# Patient Record
Sex: FEMALE
[De-identification: names, ages, dates, MRNs, and addresses within clinical notes are randomized per-mention and may not be internally consistent; named-entity substitution may affect disease eponyms.]

---

## 2017-05-04 NOTE — MAMMOGRAPHY REPORT
BONE DEXA:05/04/17 14:15:00



CLINICAL: Postmenopausal.  No comparison.



TECHNIQUE: Two site bone DEXA performed on an Hologic scanner.





FINDINGS:

The average BMD of the lumbar spine L1-L4 is 0.919g/cm squared with a 

T-score of -1.2 and a Z-score of +0.5.



The average BMD of the left hip is 0.877g/cm squared with a T-score of 

-0.5 and a Z-score of +0.6.



IMPRESSION:

1. WHO classification: Osteopenia with increased fracture risk  based 

on lumbar spine measurements.  



2. WHO classification: Normal with average fracture risk  based on left 

hip measurements.  



3. The FRAX 10 year fracture probability for a major osteoporotic 

fracture is 7.3%.



4. The FRAX 10 year fracture probability for hip fracture is 0.4%.



Note: FRAX version 3.01.  Fracture probability calculated for an 

untreated patient.  Fracture probability may be lower if the patient 

has received treatment.



RECOMMENDATION: Clinical correlation and routine screening.





DEFINITIONS:

  BMD     = Bone Mineral Density

  T-score = BMD related to mean peak bone mass of young adult

            (mean expressed in Standard Deviation)

  Z-score = Age matched BMD expressed in SD



World Health Organization (WHO) Diagnostic Criteria

  Normal             T-score > -1 SD

  Osteopenia      T-score between -1 and -2.4 SD

  Osteoporosis    T-score -2.5 SD or below



NOTE:

      BMD is not the only risk factor for fracture; also consider 

factors such as the patient's age, risk of falling, previous 

osteoporotic fracture, family history of osteoporotic fractures, 

current smoker, and low body weight.



All treatment decisions require clinical judgment and consideration of 

individual patient factors, including patient preferences, 

comorbidities, previous drug use and risk factors not captured in the 

FRAX model (e.g. frailty, falls, vitamin D deficiency, increased bone 

turnover, interval significant decline in BMD). 



Fracture probability is calculated for an untreated patient.  Fracture 

probability may be lower if the patient has received treatment.



Z-scores are not calculated if >80 years of age.

## 2018-04-13 ENCOUNTER — HOSPITAL ENCOUNTER (OUTPATIENT)
Dept: HOSPITAL 5 - MAMMO | Age: 63
Discharge: HOME | End: 2018-04-13
Attending: OBSTETRICS & GYNECOLOGY
Payer: COMMERCIAL

## 2018-04-13 DIAGNOSIS — Z12.31: Primary | ICD-10-CM

## 2018-04-13 PROCEDURE — 77067 SCR MAMMO BI INCL CAD: CPT

## 2018-04-13 PROCEDURE — 77063 BREAST TOMOSYNTHESIS BI: CPT

## 2018-04-13 NOTE — MAMMOGRAPHY REPORT
Tomomammogram and 2-D mammogram:



The 2-D mammogram demonstrates a heterogeneously dense fibroglandular 

pattern with no significant findings. The tomomammogram of the right 

breast is also unremarkable. The tomomammogram of the left breast 

demonstrates a 3 mm circumscribed nodule superiorly having no 

suspicious characteristics. No other findings.



CAD used.



Impression:



No significant findings.



Recommendation:



Annual mammogram followup.



BI-RADS CATEGORY:  2 = Benign



ACR BI-RADS MAMMOGRAPHIC CODES:

0 = Needs additional imaging evaluation; 1 = Negative; 2 = Benign; 3 = 

Probably benign; 4 = Suspicious; 5 = Malignant; 6 = Known biopsy-proven 

malignancy



COMMENT:

      1.   Dense breast tissue, i.e., adenosis, fibrocystic 

            changes, etc., may obscure an underlying neoplasm.

      2.   Approximately 10% of cancers are not detected with

            mammography.

      3.   A negative mammography report should not delay biopsy 

            if a clinically suspicious mass is present.

## 2024-03-29 ENCOUNTER — LAB (OUTPATIENT)
Dept: URBAN - METROPOLITAN AREA CLINIC 70 | Facility: CLINIC | Age: 69
End: 2024-03-29

## 2024-03-29 ENCOUNTER — OV NP (OUTPATIENT)
Dept: URBAN - METROPOLITAN AREA CLINIC 70 | Facility: CLINIC | Age: 69
End: 2024-03-29

## 2024-03-29 VITALS
DIASTOLIC BLOOD PRESSURE: 85 MMHG | HEART RATE: 75 BPM | WEIGHT: 176.4 LBS | SYSTOLIC BLOOD PRESSURE: 149 MMHG | BODY MASS INDEX: 31.25 KG/M2 | TEMPERATURE: 97.9 F | HEIGHT: 63 IN

## 2024-03-29 PROBLEM — 312824007: Status: ACTIVE | Noted: 2024-03-29

## 2024-03-29 PROBLEM — 40890009: Status: ACTIVE | Noted: 2024-03-29

## 2024-03-29 PROBLEM — 27885002: Status: ACTIVE | Noted: 2024-03-29

## 2024-03-29 PROBLEM — 162031009: Status: ACTIVE | Noted: 2024-03-29

## 2024-03-29 RX ORDER — AMLODIPINE BESYLATE 2.5 MG/1
TAKE 1 TABLET BY MOUTH DAILY TABLET ORAL
Qty: 90 EACH | Refills: 0 | Status: ACTIVE | COMMUNITY

## 2024-03-29 NOTE — HPI-TODAY'S VISIT:
Recnt hx of heart block  CTA at [facility] showed hiatal hernia  C/o "indigestion" nad badominal fulness C/o epigastric mild soreness Reports dry food like bread gets stuck in throat at times Denies having any nausea, vomiting,  melena, hematochezia, weight loss, lack of appetite Medications and social history reviewed.  Family history reviewed- fakily hx of  colon cancer im Mother nad Fahter

## 2024-04-18 PROBLEM — 166669000: Status: ACTIVE | Noted: 2024-04-18

## 2024-04-18 PROBLEM — 409673008: Status: ACTIVE | Noted: 2024-04-18

## 2024-05-14 ENCOUNTER — OFFICE VISIT (OUTPATIENT)
Dept: URBAN - METROPOLITAN AREA MEDICAL CENTER 42 | Facility: MEDICAL CENTER | Age: 69
End: 2024-05-14
Payer: MEDICARE

## 2024-05-14 DIAGNOSIS — D12.2 ADENOMA OF ASCENDING COLON: ICD-10-CM

## 2024-05-14 DIAGNOSIS — K29.60 OTHER GASTRITIS WITHOUT BLEEDING: ICD-10-CM

## 2024-05-14 DIAGNOSIS — Z12.11 COLON CANCER SCREENING: ICD-10-CM

## 2024-05-14 DIAGNOSIS — K20.80 OTHER ESOPHAGITIS: ICD-10-CM

## 2024-05-14 DIAGNOSIS — Z80.0 BROTHER AT YOUNG AGE FAMILY HISTORY OF COLON CANCER: ICD-10-CM

## 2024-05-14 DIAGNOSIS — K22.2 ACQUIRED ESOPHAGEAL RING: ICD-10-CM

## 2024-05-14 PROCEDURE — 43249 ESOPH EGD DILATION <30 MM: CPT | Performed by: INTERNAL MEDICINE

## 2024-05-14 PROCEDURE — 43239 EGD BIOPSY SINGLE/MULTIPLE: CPT | Performed by: INTERNAL MEDICINE

## 2024-05-14 PROCEDURE — 45385 COLONOSCOPY W/LESION REMOVAL: CPT | Performed by: INTERNAL MEDICINE

## 2024-05-14 PROCEDURE — 45380 COLONOSCOPY AND BIOPSY: CPT | Performed by: INTERNAL MEDICINE

## 2024-06-10 ENCOUNTER — TELEPHONE ENCOUNTER (OUTPATIENT)
Dept: URBAN - METROPOLITAN AREA CLINIC 70 | Facility: CLINIC | Age: 69
End: 2024-06-10

## 2024-06-10 RX ORDER — PANTOPRAZOLE SODIUM 40 MG/1
1 TABLET TABLET, DELAYED RELEASE ORAL ONCE A DAY
Qty: 30 | OUTPATIENT
Start: 2024-06-10

## 2024-06-17 ENCOUNTER — ERX REFILL RESPONSE (OUTPATIENT)
Dept: URBAN - METROPOLITAN AREA CLINIC 70 | Facility: CLINIC | Age: 69
End: 2024-06-17

## 2024-06-17 RX ORDER — PANTOPRAZOLE SODIUM 40 MG/1
1 TABLET TABLET, DELAYED RELEASE ORAL ONCE A DAY
Qty: 30 | Refills: 0 | OUTPATIENT

## 2024-06-17 RX ORDER — PANTOPRAZOLE SODIUM 40 MG/1
1 TABLET TABLET, DELAYED RELEASE ORAL ONCE A DAY
Qty: 30 | OUTPATIENT

## 2024-06-18 ENCOUNTER — OFFICE VISIT (OUTPATIENT)
Dept: URBAN - METROPOLITAN AREA CLINIC 70 | Facility: CLINIC | Age: 69
End: 2024-06-18
Payer: MEDICARE

## 2024-06-18 ENCOUNTER — LAB OUTSIDE AN ENCOUNTER (OUTPATIENT)
Dept: URBAN - METROPOLITAN AREA CLINIC 70 | Facility: CLINIC | Age: 69
End: 2024-06-18

## 2024-06-18 ENCOUNTER — DASHBOARD ENCOUNTERS (OUTPATIENT)
Age: 69
End: 2024-06-18

## 2024-06-18 VITALS
SYSTOLIC BLOOD PRESSURE: 153 MMHG | TEMPERATURE: 97.9 F | HEIGHT: 63 IN | HEART RATE: 65 BPM | BODY MASS INDEX: 30.09 KG/M2 | DIASTOLIC BLOOD PRESSURE: 81 MMHG | WEIGHT: 169.8 LBS

## 2024-06-18 DIAGNOSIS — R10.13 DYSPEPSIA: ICD-10-CM

## 2024-06-18 DIAGNOSIS — R74.01 ELEVATED ALANINE AMINOTRANSFERASE (ALT) LEVEL: ICD-10-CM

## 2024-06-18 DIAGNOSIS — Z80.0 FAMILY HISTORY OF COLON CANCER: ICD-10-CM

## 2024-06-18 DIAGNOSIS — R13.19 ESOPHAGEAL DYSPHAGIA: ICD-10-CM

## 2024-06-18 PROBLEM — 266433003: Status: ACTIVE | Noted: 2024-06-18

## 2024-06-18 PROCEDURE — 99214 OFFICE O/P EST MOD 30 MIN: CPT | Performed by: INTERNAL MEDICINE

## 2024-06-18 RX ORDER — PANTOPRAZOLE SODIUM 40 MG/1
1 TABLET TABLET, DELAYED RELEASE ORAL ONCE A DAY
Qty: 30 | Refills: 3 | OUTPATIENT

## 2024-06-18 RX ORDER — PANTOPRAZOLE SODIUM 40 MG/1
1 TABLET TABLET, DELAYED RELEASE ORAL ONCE A DAY
Qty: 30 | Refills: 0 | Status: ACTIVE | COMMUNITY

## 2024-06-18 RX ORDER — AMLODIPINE BESYLATE 2.5 MG/1
TAKE 1 TABLET BY MOUTH DAILY TABLET ORAL
Qty: 90 EACH | Refills: 0 | Status: ACTIVE | COMMUNITY

## 2024-06-18 NOTE — HPI-TODAY'S VISIT:
69-year-old female here for follow-up.  Previously seen with complaint of dyspepsia, dysphagia.  Has family history of colon cancer. No active complaints at today's visit. Dysphagia and dyspepsia has improved since after EGD. She was recently on an international trip where she noted some indigestion, but that has since resolved per patient. Denies having any nausea, vomiting, abdominal pain, melena, hematochezia, weight loss, lack of appetite.   Endoscopic data reviewed and discussed with patient. Patient underwent EGD and colonoscopy in May 2024.  Found to have 1 benign mild esophageal stricture in the distal esophagus.  Stricture was dilated using TTS balloon dilator to 18 mm.  Normal gastric mucosa.  Normal examined duodenum.  Stomach biopsy showed mild chronic gastritis.  Distal and proximal esophagus biopsies shows mild active esophagitis no evidence of eosinophilic esophagitis, negative for intestinal metaplasia and fungal organisms. Colonoscopy 5/14/2024.  Moderate spasm in the entire colon, 4 mm polyp in cecum, 3 to 5 mm polyps in ascending colon status post resection.  Pathology showed tubular adenoma in the ascending colon and colonic mucosa in the cecum polyp.

## 2024-06-21 ENCOUNTER — TELEPHONE ENCOUNTER (OUTPATIENT)
Dept: URBAN - METROPOLITAN AREA CLINIC 70 | Facility: CLINIC | Age: 69
End: 2024-06-21

## 2024-06-25 LAB
ALBUMIN/GLOBULIN RATIO: 1.3
ALBUMIN: 4.4
ALKALINE PHOSPHATASE: 69
ALT (SGPT): 14
AST (SGOT): 20
BILIRUBIN, DIRECT: 0.1
BILIRUBIN, INDIRECT: 0.6
BILIRUBIN, TOTAL: 0.7
GLOBULIN: 3.3
PROTEIN, TOTAL: 7.7

## 2024-07-08 ENCOUNTER — TELEPHONE ENCOUNTER (OUTPATIENT)
Dept: URBAN - METROPOLITAN AREA CLINIC 70 | Facility: CLINIC | Age: 69
End: 2024-07-08

## 2024-07-08 ENCOUNTER — LAB OUTSIDE AN ENCOUNTER (OUTPATIENT)
Dept: URBAN - METROPOLITAN AREA CLINIC 70 | Facility: CLINIC | Age: 69
End: 2024-07-08

## 2024-07-08 PROBLEM — 197321007: Status: ACTIVE | Noted: 2024-07-08
